# Patient Record
(demographics unavailable — no encounter records)

---

## 2024-11-01 NOTE — HISTORY OF PRESENT ILLNESS
[Parents] : parents [whole] : whole milk [Fruit] : fruit [Vegetables] : vegetables [Meat] : meat [Eggs] : eggs [Fish] : fish [Vitamins] : takes vitamins  [___ stools per day] : [unfilled]  stools per day [Normal] : Normal [Brushing teeth twice/d] : brushing teeth twice per day [Yes] : Patient goes to dentist yearly [Playtime (60 min/d)] : playtime 60 min a day [Participates in after-school activities] : participates in after-school activities [< 2 hrs of screen time per day] : less than 2 hrs of screen time per day [TV in bedroom] : tv in bedroom [Appropiate parent-child-sibling interaction] : appropriate parent-child-sibling interaction [Does chores when asked] : does chores when asked [Has Friends] : has friends [Grade ___] : Grade [unfilled] [No] : No cigarette smoke exposure [NO] : No

## 2025-06-20 NOTE — HISTORY OF PRESENT ILLNESS
[de-identified] : left foot injury [FreeTextEntry6] : hurt his left foot yesterday  as he was running twisted in knee forward and ankle as well now swollen and a lot of  pain also has been complaining of on and off knee pain for the last few months after playin or runnning a lot

## 2025-06-20 NOTE — HISTORY OF PRESENT ILLNESS
[de-identified] : left foot injury [FreeTextEntry6] : hurt his left foot yesterday  as he was running twisted in knee forward and ankle as well now swollen and a lot of  pain also has been complaining of on and off knee pain for the last few months after playin or runnning a lot

## 2025-06-20 NOTE — DISCUSSION/SUMMARY
[FreeTextEntry1] : send for stat xray or podiatry to rule out fracture refer to ortho for LCP and also [facility] spoke to father in detail  of risks and what to look out for